# Patient Record
Sex: MALE | Race: WHITE | NOT HISPANIC OR LATINO | Employment: FULL TIME | URBAN - METROPOLITAN AREA
[De-identification: names, ages, dates, MRNs, and addresses within clinical notes are randomized per-mention and may not be internally consistent; named-entity substitution may affect disease eponyms.]

---

## 2023-08-08 ENCOUNTER — TELEPHONE (OUTPATIENT)
Dept: OBGYN CLINIC | Facility: CLINIC | Age: 38
End: 2023-08-08

## 2023-08-08 DIAGNOSIS — Z31.41 FERTILITY TESTING: Primary | ICD-10-CM

## 2023-08-08 NOTE — TELEPHONE ENCOUNTER
Drea Ashby, spouse called requesting order for semen analysis for [facility]. Return call to Drea Ashby. [facility] needs order in chart. Advised will forward to Dr Ximena Bowser to address.   Drea Ashby will watch for order in patient portal.

## 2023-08-09 ENCOUNTER — OFFICE VISIT (OUTPATIENT)
Dept: URGENT CARE | Facility: CLINIC | Age: 38
End: 2023-08-09
Payer: COMMERCIAL

## 2023-08-09 VITALS
HEIGHT: 74 IN | BODY MASS INDEX: 28.11 KG/M2 | TEMPERATURE: 97.7 F | DIASTOLIC BLOOD PRESSURE: 78 MMHG | RESPIRATION RATE: 16 BRPM | OXYGEN SATURATION: 99 % | HEART RATE: 78 BPM | SYSTOLIC BLOOD PRESSURE: 148 MMHG | WEIGHT: 219 LBS

## 2023-08-09 DIAGNOSIS — S05.01XA CONJUNCTIVAL ABRASION, RIGHT, INITIAL ENCOUNTER: Primary | ICD-10-CM

## 2023-08-09 PROCEDURE — 99213 OFFICE O/P EST LOW 20 MIN: CPT | Performed by: PHYSICIAN ASSISTANT

## 2023-08-09 RX ORDER — TOBRAMYCIN AND DEXAMETHASONE 3; 1 MG/ML; MG/ML
1 SUSPENSION/ DROPS OPHTHALMIC
Qty: 1.75 ML | Refills: 0 | Status: SHIPPED | OUTPATIENT
Start: 2023-08-09 | End: 2023-08-16

## 2023-08-09 NOTE — PATIENT INSTRUCTIONS
1 eye drop in right eye every 4 hours while awake  Call Dong [phone] or Dr Pulido Ruben - [phone] for follow up appointment  Follow up with PCP in 3-5 days. Proceed to  ER if symptoms worsen.

## 2023-08-09 NOTE — PROGRESS NOTES
[facility]        NAME: Sofía Perez is a 45 y.o. male  : 1985    MRN: [id number]  DATE: 2023  TIME: 1:39 PM    Assessment and Plan   Conjunctival abrasion, right, initial encounter [S05.01XA]  1. Conjunctival abrasion, right, initial encounter  tobramycin-dexamethasone (TOBRADEX) ophthalmic suspension            Patient Instructions     1 eye drop in right eye every 4 hours while awake  Call Dong [phone] or Dr Ruben Oneill - [phone] for follow up appointment  Follow up with PCP in 3-5 days. Proceed to  ER if symptoms worsen. Chief Complaint     Chief Complaint   Patient presents with   • Eye Problem     Pt reports right eye pain x1 month. Reports he thinks he got something in his eye one month ago while doing yard work. History of Present Illness       Eye Problem   The right eye is affected. This is a new problem. Episode onset: 1 month ago. The problem occurs daily. The problem has been unchanged. The injury mechanism was a foreign body. The pain is mild. There is no known exposure to pink eye. He does not wear contacts. Associated symptoms include eye redness. Pertinent negatives include no blurred vision, eye discharge, double vision, fever, foreign body sensation, itching, photophobia, recent URI or vomiting. He has tried eye drops for the symptoms. The treatment provided no relief. He states while doing yard work 1 month ago he got dirt or wood in his eye. After a few hours he believes the foreign body was removed but he has had persistent mild irritation/pain since that time. He has also noted a red spot at the bottom of his iris since the incident one month ago. Review of Systems   Review of Systems   Constitutional: Negative for chills and fever. HENT: Negative for ear pain and sore throat. Eyes: Positive for redness. Negative for blurred vision, double vision, photophobia, pain, discharge, itching and visual disturbance. Respiratory: Negative for cough and shortness of breath. Cardiovascular: Negative for chest pain and palpitations. Gastrointestinal: Negative for abdominal pain and vomiting. Genitourinary: Negative for dysuria and hematuria. Musculoskeletal: Negative for arthralgias and back pain. Skin: Negative for color change and rash. Neurological: Negative for seizures and syncope. All other systems reviewed and are negative. Current Medications       Current Outpatient Medications:   •  tobramycin-dexamethasone (TOBRADEX) ophthalmic suspension, Administer 1 drop to the right eye every 4 (four) hours while awake for 7 days, Disp: 1.75 mL, Rfl: 0    Current Allergies     Allergies as of 08/09/2023 - Reviewed 08/09/2023   Allergen Reaction Noted   • Penicillins Hives 08/09/2023            The following portions of the patient's history were reviewed and updated as appropriate: allergies, current medications, past family history, past medical history, past social history, past surgical history and problem list.     History reviewed. No pertinent past medical history. History reviewed. No pertinent surgical history. No family history on file. Medications have been verified. Objective   /78   Pulse 78   Temp 97.7 °F (36.5 °C)   Resp 16   Ht 6' 2" (1.88 m)   Wt 99.3 kg (219 lb)   SpO2 99%   BMI 28.12 kg/m²   No LMP for male patient. Physical Exam     Physical Exam  Vitals and nursing note reviewed. Constitutional:       Appearance: Normal appearance. He is not ill-appearing. HENT:      Head: Normocephalic and atraumatic. Nose: Nose normal.   Eyes:      Extraocular Movements: Extraocular movements intact. Pupils: Pupils are equal, round, and reactive to light. Comments: No pain with EOM  No conjunctival erythema  No foreign body visualized  At the inferior border of the iris is a small erythematous region which appears slightly raised.       The eye was anesthestized with tetracaine drops and fluorescein was applied. Visualized under black light revealed linear uptake extending from the red spot on the iris laterally. Cardiovascular:      Rate and Rhythm: Normal rate and regular rhythm. Pulses: Normal pulses. Heart sounds: Normal heart sounds. Pulmonary:      Effort: Pulmonary effort is normal.      Breath sounds: Normal breath sounds. Skin:     General: Skin is warm and dry. Neurological:      Mental Status: He is alert and oriented to person, place, and time.    Psychiatric:         Mood and Affect: Mood normal.         Behavior: Behavior normal.

## 2023-08-10 NOTE — TELEPHONE ENCOUNTER
Pt's wife Kay Cooper called informing she is unable to view her  ([facility] semen analysis order) in his chart. Order is in chart under Labs. Left a message they may call [facility] at [phone] to schedule, the lab will be able to view order in the chart. If she needs any further assistance, please call back.

## 2023-09-01 ENCOUNTER — APPOINTMENT (OUTPATIENT)
Dept: LAB | Facility: CLINIC | Age: 38
End: 2023-09-01
Payer: COMMERCIAL

## 2023-09-01 DIAGNOSIS — Z31.41 FERTILITY TESTING: ICD-10-CM

## 2023-09-01 LAB
B ABORTUS AB SMN QL AGGL: ABNORMAL
COLLECTION DATE & TIME: ABNORMAL
LIQUEFACTION TIME SMN: 60 MIN
PH SMN: 8.3 [PH] (ref 7.2–8.6)
SEX ABSTIN DURATION TIME PATIENT: 3 D
SPECIMEN VOL SMN: 3.3 ML (ref 1–5)
SPERM # SMN: 184.8 MILLION/EJACULATION (ref 40–1000)
SPERM AMORPHOUS HEAD NFR SMN: 6 %
SPERM MORPH PNL SMN: 14
SPERM MOTILE SMN QL MICRO: 57 %
SPERM MOTILITY SCORE SMN AUTO: 2 (ref 2–4)
SPERM PROG NFR SMN: 4 % (ref 2–4)
SPERM SMN: 0 %
SPERM SMN: 0 %
SPERM SMN: 18 %
SPERM SMN: 18 %
SPERM SMN: 2 %
SPERM SMN: 20 %
SPERM SMN: 22 %
SPERM SMN: 56 /ML (ref 20–999)
SPERM SMN: 8 % (ref 50–100)
SPERM SMN: 92 % (ref 0–50)
SPERM SMN: ABNORMAL
VISC SMN: 4 CP (ref 3–4)
WBC SMN QL: <1 "HPF"

## 2023-09-01 PROCEDURE — 89320 SEMEN ANAL VOL/COUNT/MOT: CPT

## 2023-11-24 ENCOUNTER — HOSPITAL ENCOUNTER (EMERGENCY)
Facility: HOSPITAL | Age: 38
Discharge: HOME | End: 2023-11-24
Attending: EMERGENCY MEDICINE
Payer: COMMERCIAL

## 2023-11-24 VITALS
OXYGEN SATURATION: 98 % | WEIGHT: 215 LBS | DIASTOLIC BLOOD PRESSURE: 76 MMHG | RESPIRATION RATE: 18 BRPM | HEIGHT: 75 IN | SYSTOLIC BLOOD PRESSURE: 134 MMHG | TEMPERATURE: 97.7 F | BODY MASS INDEX: 26.73 KG/M2 | HEART RATE: 56 BPM

## 2023-11-24 DIAGNOSIS — R10.30 LOWER ABDOMINAL PAIN: Primary | ICD-10-CM

## 2023-11-24 PROCEDURE — 99283 EMERGENCY DEPT VISIT LOW MDM: CPT

## 2023-11-24 PROCEDURE — 99283 EMERGENCY DEPT VISIT LOW MDM: CPT | Performed by: EMERGENCY MEDICINE

## 2023-11-25 NOTE — ED PROVIDER NOTES
History  Chief Complaint   Patient presents with    Abdominal Pain     Pt reports mid lower abdominal pain for the past 6 hours. Denies n/v/d      Patient is a 59-year-old male who presents with abdominal pain that has resolved. Patient states that he had abdominal pain in the lower abdomen for approximately 6 hours that felt like gas pains. He states that the pain was sharp, nonradiating, throughout the lower abdomen, moderate in intensity. Denies any associated symptoms. States that the pain resolved. Prior to Admission Medications   Prescriptions Last Dose Informant Patient Reported? Taking?   tobramycin-dexamethasone (TOBRADEX) ophthalmic suspension   No No   Sig: Administer 1 drop to the right eye every 4 (four) hours while awake for 7 days      Facility-Administered Medications: None       History reviewed. No pertinent past medical history. History reviewed. No pertinent surgical history. History reviewed. No pertinent family history. I have reviewed and agree with the history as documented. E-Cigarette/Vaping     E-Cigarette/Vaping Substances     Social History     Tobacco Use    Smoking status: Former     Types: Cigarettes    Smokeless tobacco: Never   Substance Use Topics    Alcohol use: Never    Drug use: Never       Review of Systems   Constitutional:  Negative for chills and fever. Respiratory:  Negative for shortness of breath. Cardiovascular:  Negative for chest pain. Gastrointestinal:  Positive for abdominal pain. Negative for blood in stool, constipation, diarrhea, nausea and vomiting. Genitourinary:  Negative for dysuria, flank pain and hematuria. Physical Exam  Physical Exam  Vitals and nursing note reviewed. Constitutional:       General: He is not in acute distress. Appearance: Normal appearance. He is not ill-appearing, toxic-appearing or diaphoretic. HENT:      Head: Normocephalic and atraumatic.       Mouth/Throat:      Mouth: Mucous membranes are moist.   Eyes:      Conjunctiva/sclera: Conjunctivae normal.      Pupils: Pupils are equal, round, and reactive to light. Cardiovascular:      Rate and Rhythm: Normal rate and regular rhythm. Pulmonary:      Effort: Pulmonary effort is normal. No respiratory distress. Breath sounds: Normal breath sounds. No stridor. No wheezing, rhonchi or rales. Chest:      Chest wall: No tenderness. Abdominal:      General: Bowel sounds are normal. There is no distension. Palpations: Abdomen is soft. There is no mass. Tenderness: There is no abdominal tenderness. There is no right CVA tenderness, left CVA tenderness, guarding or rebound. Hernia: No hernia is present. Skin:     General: Skin is warm and dry. Neurological:      General: No focal deficit present. Mental Status: He is alert and oriented to person, place, and time. Mental status is at baseline. Psychiatric:         Mood and Affect: Mood normal.         Behavior: Behavior normal.         Vital Signs  ED Triage Vitals   Temperature Pulse Respirations Blood Pressure SpO2   11/24/23 1902 11/24/23 1902 11/24/23 1903 11/24/23 1903 11/24/23 1902   97.7 °F (36.5 °C) 56 18 134/76 98 %      Temp Source Heart Rate Source Patient Position - Orthostatic VS BP Location FiO2 (%)   11/24/23 1902 11/24/23 1902 11/24/23 1902 11/24/23 1902 --   Temporal Monitor Sitting Left arm       Pain Score       --                  Vitals:    11/24/23 1902 11/24/23 1903   BP:  134/76   Pulse: 56    Patient Position - Orthostatic VS: Sitting          Visual Acuity      ED Medications  Medications - No data to display    Diagnostic Studies  Results Reviewed       None                   No orders to display              Procedures  Procedures         ED Course                               SBIRT 22yo+      Flowsheet Row Most Recent Value   Initial Alcohol Screen: US AUDIT-C     1. How often do you have a drink containing alcohol? 0 Filed at: 11/24/2023 1903   2. How many drinks containing alcohol do you have on a typical day you are drinking? 0 Filed at: 11/24/2023 1903   3a. Male UNDER 65: How often do you have five or more drinks on one occasion? 0 Filed at: 11/24/2023 1903   3b. FEMALE Any Age, or MALE 65+: How often do you have 4 or more drinks on one occassion? 0 Filed at: 11/24/2023 1903   Audit-C Score 0 Filed at: 11/24/2023 1903   ROULA: How many times in the past year have you. .. Used an illegal drug or used a prescription medication for non-medical reasons? Never Filed at: 11/24/2023 1903                      Medical Decision Making  Assessment and plan:  Lower abdominal pain that has resolved. Had a shared decision-making discussion with the patient reviewing that pain could have been gas pain versus start of diverticulitis versus kidney stone versus a number of other abdominal pathologies. As pain is resolved, discussed discharge with observation and return for recurrent/worsening symptoms versus lab work at this time. Patient electing to go home and return for any worsening. All questions answered. Disposition  Final diagnoses:   Lower abdominal pain     Time reflects when diagnosis was documented in both MDM as applicable and the Disposition within this note       Time User Action Codes Description Comment    11/24/2023  7:58 PM Eber Argueta Add [R10.30] Lower abdominal pain           ED Disposition       ED Disposition   Discharge    Condition   Stable    Date/Time   Fri Nov 24, 2023 [address] discharge to home/self care.                    Follow-up Information       Follow up With Specialties Details Why Contact Info Additional [address] [facility] Emergency Department Emergency Medicine Go to  As needed, If symptoms worsen, for re-evaluation [address] [facility] [phone]  [address] [facility] Emergency Department, [address] [address] [facility] [place], [address]            Discharge Medication List as of 11/24/2023  7:58 PM        CONTINUE these medications which have NOT CHANGED    Details   tobramycin-dexamethasone (TOBRADEX) ophthalmic suspension Administer 1 drop to the right eye every 4 (four) hours while awake for 7 days, Starting Wed 8/9/2023, Until Wed 8/16/2023, Normal             No discharge procedures on file.     PDMP Review       None            ED Provider  Electronically Signed by             Brittany Carroll DO  11/24/23 2002

## 2023-12-28 ENCOUNTER — TELEPHONE (OUTPATIENT)
Age: 38
End: 2023-12-28

## 2023-12-28 NOTE — TELEPHONE ENCOUNTER
Pt's wife called inquiring scheduling npt appointment for fertility as semen analysis had abnormal findings I TEAMS clinical RN [facility] questioning if this needs to be addressed fertility specialist which she agreed,I relayed to wife and gave her multiple clinics/phone numbers she was grateful no further action required.